# Patient Record
Sex: FEMALE | Race: WHITE
[De-identification: names, ages, dates, MRNs, and addresses within clinical notes are randomized per-mention and may not be internally consistent; named-entity substitution may affect disease eponyms.]

---

## 2017-07-12 ENCOUNTER — HOSPITAL ENCOUNTER (EMERGENCY)
Dept: HOSPITAL 45 - C.EDB | Age: 27
Discharge: HOME | End: 2017-07-12
Payer: COMMERCIAL

## 2017-07-12 VITALS — OXYGEN SATURATION: 95 % | HEART RATE: 61 BPM | DIASTOLIC BLOOD PRESSURE: 76 MMHG | SYSTOLIC BLOOD PRESSURE: 127 MMHG

## 2017-07-12 VITALS
BODY MASS INDEX: 31.49 KG/M2 | HEIGHT: 62.01 IN | WEIGHT: 173.28 LBS | HEIGHT: 62.01 IN | BODY MASS INDEX: 31.49 KG/M2 | WEIGHT: 173.28 LBS

## 2017-07-12 VITALS — TEMPERATURE: 98.24 F

## 2017-07-12 DIAGNOSIS — Z79.899: ICD-10-CM

## 2017-07-12 DIAGNOSIS — F41.9: ICD-10-CM

## 2017-07-12 DIAGNOSIS — Z86.61: ICD-10-CM

## 2017-07-12 DIAGNOSIS — E55.9: ICD-10-CM

## 2017-07-12 DIAGNOSIS — Z83.79: ICD-10-CM

## 2017-07-12 DIAGNOSIS — R51: ICD-10-CM

## 2017-07-12 DIAGNOSIS — Z83.3: ICD-10-CM

## 2017-07-12 DIAGNOSIS — I10: ICD-10-CM

## 2017-07-12 DIAGNOSIS — F17.210: ICD-10-CM

## 2017-07-12 DIAGNOSIS — E87.6: ICD-10-CM

## 2017-07-12 DIAGNOSIS — Z82.49: ICD-10-CM

## 2017-07-12 DIAGNOSIS — R07.2: Primary | ICD-10-CM

## 2017-07-12 LAB
ANION GAP SERPL CALC-SCNC: 8 MMOL/L (ref 3–11)
BASOPHILS # BLD: 0.04 K/UL (ref 0–0.2)
BASOPHILS NFR BLD: 0.3 %
BUN SERPL-MCNC: 5 MG/DL (ref 7–18)
BUN/CREAT SERPL: 7.7 (ref 10–20)
CALCIUM SERPL-MCNC: 9.2 MG/DL (ref 8.5–10.1)
CHLORIDE SERPL-SCNC: 105 MMOL/L (ref 98–107)
CO2 SERPL-SCNC: 27 MMOL/L (ref 21–32)
COMPLETE: YES
CREAT CL PREDICTED SERPL C-G-VRATE: 118.3 ML/MIN
CREAT SERPL-MCNC: 0.7 MG/DL (ref 0.6–1.2)
EOSINOPHIL NFR BLD AUTO: 278 K/UL (ref 130–400)
GLUCOSE SERPL-MCNC: 78 MG/DL (ref 70–99)
HCT VFR BLD CALC: 39.3 % (ref 37–47)
IG%: 0.2 %
IMM GRANULOCYTES NFR BLD AUTO: 27.3 %
LYMPHOCYTES # BLD: 3.82 K/UL (ref 1.2–3.4)
MCH RBC QN AUTO: 29.4 PG (ref 25–34)
MCHC RBC AUTO-ENTMCNC: 33.3 G/DL (ref 32–36)
MCV RBC AUTO: 88.1 FL (ref 80–100)
MONOCYTES NFR BLD: 7.2 %
NEUTROPHILS # BLD AUTO: 0.6 %
NEUTROPHILS NFR BLD AUTO: 64.4 %
PMV BLD AUTO: 10.6 FL (ref 7.4–10.4)
POTASSIUM SERPL-SCNC: 3.4 MMOL/L (ref 3.5–5.1)
RBC # BLD AUTO: 4.46 M/UL (ref 4.2–5.4)
SODIUM SERPL-SCNC: 140 MMOL/L (ref 136–145)
TSH SERPL-ACNC: 1 UIU/ML (ref 0.3–4.5)
WBC # BLD AUTO: 13.99 K/UL (ref 4.8–10.8)

## 2017-07-12 NOTE — EMERGENCY ROOM VISIT NOTE
History


First contact with patient:  18:15


Chief Complaint:  CARDIAC ASSESSMENT


Stated Complaint:  CHEST PAIN, DIZZINESS


Nursing Triage Summary:  


pt c/o chest pain started to worsen 2 weeks ago pt reports having chronic 


palpatations. has head pressure when standing, described as sharp and achey





History of Present Illness


The patient is a 26 year old female who presents to the Emergency Room with 

complaints of palpitations. The patient states that she has had this feeling 

that her heart is skipping beats and beating out of her chest over the last 2 

weeks, but has acutely worsened over the last week. She states that these 

usually are intermittent but have been consistently occurring throughout the 

day. She is currently on Atenolol for a rapid heart rate, and follows with 

[facility] cardiology. She has also had intermittent chest pain over the last 2 

weeks as well that changes in position over her chest. She states that she has 

also been lightheaded and dizzy during these episodes and is having difficulty 

standing and needs to lie down. She states like she feels she has a lot of 

pressure in her head and it feels like it is going to explode. She denies any 

fainting or loss of consciousness. When she lays down she is no longer 

lightheaded but still has the palpitations.  She denies any abdominal pain,and 

fever chills lightheadedness nausea or vomiting.  She has not taken any Ativan 

because she does not want to treat her symptoms with that.





Review of Systems


See HPI for pertinent positives and negatives.  A total of ten systems were 

reviewed and were otherwise negative.





Past Medical/Surgical History


Medical Problems:


(1) Anxiety


(2) HTN (hypertension)


(3) Hypokalemia


(4) left foot surgery


(5) Tibial plateau fracture, left


(6) Vitamin D deficiency








Family History





Diabetes mellitus


Gallbladder disease


Hypertension





Social History


Smoking Status:  Current Every Day Smoker


Alcohol Use:  occasionally


Drug Use:  none


Marital Status:  single


Housing Status:  other


Occupation Status:  employed





Current/Historical Medications


Scheduled


Atenolol (Atenolol), 25 MG PO QAM





Allergies


Coded Allergies:  


     Tramadol (Verified  Adverse Reaction, Intermediate, N/V, 10/16/16)





Physical Exam


Vital Signs











  Date Time  Temp Pulse Resp B/P (MAP) Pulse Ox O2 Delivery O2 Flow Rate FiO2


 


7/12/17 21:44  61 20 127/76 95 Room Air  


 


7/12/17 20:30  72 20 124/87 98 Room Air  


 


7/12/17 19:47  66 20 117/83 99 Room Air  


 


7/12/17 19:07  69      


 


7/12/17 19:03  68 20 134/91 99 Room Air  


 


7/12/17 17:24 36.8 75 18 147/94 100 Room Air  











Physical Exam


GENERAL: Awake, alert, well-appearing, in no distress


HENT: Normocephalic, atraumatic. 


EYES: Normal conjunctiva. Sclera non-icteric.


NECK: Supple. No nuchal rigidity.


RESPIRATORY: Clear to auscultation.


CARDIAC: Regular rate, normal rhythm. Extremities warm and well perfused. 

Pulses equal.


ABDOMEN: Soft, non-distended. No tenderness to palpation. No rebound or 

guarding. No masses.


RECTAL: Deferred.


MUSCULOSKELETAL: Chest examination reveals no tenderness. 


LOWER EXTREMITIES: Calves are equal size bilaterally and non-tender. No edema. 

No discoloration. 


NEURO: Normal sensorium. No sensory or motor deficits noted. 


SKIN: No rash or jaundice noted.





Medical Decision & Procedures


Laboratory Results


7/12/17 18:55








Red Blood Count 4.46, Mean Corpuscular Volume 88.1, Mean Corpuscular Hemoglobin 

29.4, Mean Corpuscular Hemoglobin Concent 33.3, Mean Platelet Volume 10.6, 

Neutrophils (%) (Auto) 64.4, Lymphocytes (%) (Auto) 27.3, Monocytes (%) (Auto) 

7.2, Eosinophils (%) (Auto) 0.6, Basophils (%) (Auto) 0.3, Neutrophils # (Auto) 

9.00, Lymphocytes # (Auto) 3.82, Monocytes # (Auto) 1.01, Eosinophils # (Auto) 

0.09, Basophils # (Auto) 0.04





7/12/17 18:55

















Test


  7/12/17


18:40 7/12/17


18:55 7/12/17


19:01 7/12/17


21:15


 


Urine Pregnancy Test NEG (NEG)    


 


White Blood Count


  


  13.99 K/uL


(4.8-10.8) 


  


 


 


Red Blood Count


  


  4.46 M/uL


(4.2-5.4) 


  


 


 


Hemoglobin


  


  13.1 g/dL


(12.0-16.0) 


  


 


 


Hematocrit  39.3 % (37-47)   


 


Mean Corpuscular Volume


  


  88.1 fL


() 


  


 


 


Mean Corpuscular Hemoglobin


  


  29.4 pg


(25-34) 


  


 


 


Mean Corpuscular Hemoglobin


Concent 


  33.3 g/dl


(32-36) 


  


 


 


Platelet Count


  


  278 K/uL


(130-400) 


  


 


 


Mean Platelet Volume


  


  10.6 fL


(7.4-10.4) 


  


 


 


Neutrophils (%) (Auto)  64.4 %   


 


Lymphocytes (%) (Auto)  27.3 %   


 


Monocytes (%) (Auto)  7.2 %   


 


Eosinophils (%) (Auto)  0.6 %   


 


Basophils (%) (Auto)  0.3 %   


 


Neutrophils # (Auto)


  


  9.00 K/uL


(1.4-6.5) 


  


 


 


Lymphocytes # (Auto)


  


  3.82 K/uL


(1.2-3.4) 


  


 


 


Monocytes # (Auto)


  


  1.01 K/uL


(0.11-0.59) 


  


 


 


Eosinophils # (Auto)


  


  0.09 K/uL


(0-0.5) 


  


 


 


Basophils # (Auto)


  


  0.04 K/uL


(0-0.2) 


  


 


 


RDW Standard Deviation


  


  44.1 fL


(36.4-46.3) 


  


 


 


RDW Coefficient of Variation


  


  13.6 %


(11.5-14.5) 


  


 


 


Immature Granulocyte % (Auto)  0.2 %   


 


Immature Granulocyte # (Auto)


  


  0.03 K/uL


(0.00-0.02) 


  


 


 


Anion Gap


  


  8.0 mmol/L


(3-11) 


  


 


 


Est Creatinine Clear Calc


Drug Dose 


  118.3 ml/min 


  


  


 


 


Estimated GFR (


American) 


  138.6 


  


  


 


 


Estimated GFR (Non-


American 


  119.6 


  


  


 


 


BUN/Creatinine Ratio  7.7 (10-20)   


 


Calcium Level


  


  9.2 mg/dl


(8.5-10.1) 


  


 


 


Thyroid Stimulating Hormone


(TSH) 


  0.998 uIu/ml


(0.300-4.500) 


  


 


 


Bedside D-Dimer


  


  


  123 ng/mlFEU


(0-450) 


 











Medications Administered











 Medications


  (Trade)  Dose


 Ordered  Sig/Alex


 Route  Start Time


 Stop Time Status Last Admin


Dose Admin


 


 Ketorolac


 Tromethamine


  (Toradol Inj)  30 mg  NOW  STAT


 IV  7/12/17 20:00


 7/12/17 20:01 DC 7/12/17 20:33


30 MG











Medical Decision


Patient is a 26 year old female that presents with chest pain and palpitations





Differential diagnosis includes anxiety, electrolyte abnormality, arrhythmia, 

cardiac ischemia, pulmonary embolism, pneumothorax, infection, musculoskeletal, 

and other etiologies were considered as well.





Labs: CBC, CMP, Troponin, D-Dimer, TSH, Urine Pregnancy


Imaging: Chest X-Ray, EKG


Medications: Toradol 30mg IV





Impression





 Primary Impression:  


 Chest pain


Patient is a 26 year old female that presents with chest pain and palpitations


- EKG shows NSR with no ST changes


- CBC, CMP, D-Dimer, Troponin, and TSH are within normal limits


- Workup negative and no acute findings on imaging or laboratory work


- Discussed with case management making an appointment with [facility] at first available appointment





Departure Information


Dispostion


Home / Self-Care





Condition


GOOD





Referrals


No Doctor, Assigned (PCP)





Patient Instructions


My [facility]





Additional Instructions





Follow up with [facility] at the first available appointment for 

follow up of your current condition





Ibuprofen(Motrin, Advil) may be used for fever or pain.  Use 600mg every six 

hours as needed.  Take with food.  Avoid using more than 2400mg in a 24 hour 

period.  Do not use 2400mg per day for more than three consecutive days without 

physician direction.  Prolonged inappropriate use can lead to stomach upset or 

ulcers. 


(AND/OR)


Acetaminophen(Tylenol) may be used for fever or pain.  Use 1000mg every six 

hours as needed.  Avoid using more than 4000mg in a 24 hour period.  





Rest and drink plenty of fluids as tolerated.





Continue current medications.





Avoid strenuous activities and anything that worsens your pain.  Resume normal 

activities once your symptoms resolve.    





Return to the ER immediately for worsening or persistent chest pain, abdominal 

pain, vomiting, fevers, chest pains, difficulty breathing, worsening of your 

condition, or as needed.





Problem Qualifiers








 Primary Impression:  


 Chest pain


 Chest pain type:  unspecified  Qualified Codes:  R07.9 - Chest pain, 

unspecified

## 2017-07-12 NOTE — DIAGNOSTIC IMAGING REPORT
CHEST ONE VIEW PORTABLE



CLINICAL HISTORY: Palpitations, Chest Pain    



COMPARISON STUDY:  Chest radiograph August 7, 2014.



FINDINGS: Lung volumes are normal. There is no pneumothorax or pleural effusion.

There is no consolidation to suggest pneumonia. Pulmonary vascularity is normal.

Cardiomediastinal silhouette is normal. 



IMPRESSION:  No acute cardiopulmonary findings. 









Electronically signed by:  Wisam Shankar M.D.

7/12/2017 6:40 PM



Dictated Date/Time:  7/12/2017 6:40 PM

## 2017-12-04 ENCOUNTER — HOSPITAL ENCOUNTER (EMERGENCY)
Dept: HOSPITAL 45 - C.EDB | Age: 27
Discharge: HOME | End: 2017-12-04
Payer: COMMERCIAL

## 2017-12-04 VITALS
BODY MASS INDEX: 29.96 KG/M2 | HEIGHT: 62.01 IN | HEIGHT: 62.01 IN | WEIGHT: 164.91 LBS | BODY MASS INDEX: 29.96 KG/M2 | WEIGHT: 164.91 LBS

## 2017-12-04 VITALS — OXYGEN SATURATION: 99 % | SYSTOLIC BLOOD PRESSURE: 112 MMHG | HEART RATE: 76 BPM | DIASTOLIC BLOOD PRESSURE: 80 MMHG

## 2017-12-04 VITALS — TEMPERATURE: 98.24 F

## 2017-12-04 DIAGNOSIS — D72.819: ICD-10-CM

## 2017-12-04 DIAGNOSIS — O21.1: Primary | ICD-10-CM

## 2017-12-04 DIAGNOSIS — Z83.3: ICD-10-CM

## 2017-12-04 DIAGNOSIS — Z98.890: ICD-10-CM

## 2017-12-04 DIAGNOSIS — Z83.79: ICD-10-CM

## 2017-12-04 DIAGNOSIS — I10: ICD-10-CM

## 2017-12-04 DIAGNOSIS — Z88.5: ICD-10-CM

## 2017-12-04 DIAGNOSIS — E55.9: ICD-10-CM

## 2017-12-04 DIAGNOSIS — Z3A.10: ICD-10-CM

## 2017-12-04 DIAGNOSIS — F41.9: ICD-10-CM

## 2017-12-04 DIAGNOSIS — Z87.81: ICD-10-CM

## 2017-12-04 DIAGNOSIS — F17.200: ICD-10-CM

## 2017-12-04 DIAGNOSIS — Z79.899: ICD-10-CM

## 2017-12-04 DIAGNOSIS — Z82.49: ICD-10-CM

## 2017-12-04 LAB
ALBUMIN/GLOB SERPL: 0.9 {RATIO} (ref 0.9–2)
ALP SERPL-CCNC: 87 U/L (ref 45–117)
ALT SERPL-CCNC: 30 U/L (ref 12–78)
ANION GAP SERPL CALC-SCNC: 4 MMOL/L (ref 3–11)
APPEARANCE UR: (no result)
AST SERPL-CCNC: 14 U/L (ref 15–37)
BASOPHILS # BLD: 0.03 K/UL (ref 0–0.2)
BASOPHILS NFR BLD: 0.2 %
BILIRUB UR-MCNC: (no result) MG/DL
BUN SERPL-MCNC: 8 MG/DL (ref 7–18)
BUN/CREAT SERPL: 15.4 (ref 10–20)
CALCIUM SERPL-MCNC: 8.8 MG/DL (ref 8.5–10.1)
CHLORIDE SERPL-SCNC: 103 MMOL/L (ref 98–107)
CO2 SERPL-SCNC: 25 MMOL/L (ref 21–32)
COLOR UR: YELLOW
COMPLETE: YES
CREAT CL PREDICTED SERPL C-G-VRATE: 153.9 ML/MIN
CREAT SERPL-MCNC: 0.52 MG/DL (ref 0.6–1.2)
EOSINOPHIL NFR BLD AUTO: 292 K/UL (ref 130–400)
GLOBULIN SER-MCNC: 3.9 GM/DL (ref 2.5–4)
GLUCOSE SERPL-MCNC: 88 MG/DL (ref 70–99)
HCT VFR BLD CALC: 39.7 % (ref 37–47)
IG%: 0.4 %
IMM GRANULOCYTES NFR BLD AUTO: 19.6 %
LYMPHOCYTES # BLD: 3 K/UL (ref 1.2–3.4)
MANUAL MICROSCOPIC REQUIRED?: NO
MCH RBC QN AUTO: 29.5 PG (ref 25–34)
MCHC RBC AUTO-ENTMCNC: 34 G/DL (ref 32–36)
MCV RBC AUTO: 86.9 FL (ref 80–100)
MONOCYTES NFR BLD: 8.2 %
MUCOUS THREADS URNS QL MICRO: PRESENT
NEUTROPHILS # BLD AUTO: 0.6 %
NEUTROPHILS NFR BLD AUTO: 71 %
NITRITE UR QL STRIP: (no result)
PH UR STRIP: 6 [PH] (ref 4.5–7.5)
PMV BLD AUTO: 10.6 FL (ref 7.4–10.4)
POTASSIUM SERPL-SCNC: 3.5 MMOL/L (ref 3.5–5.1)
RBC # BLD AUTO: 4.57 M/UL (ref 4.2–5.4)
REVIEW REQ?: YES
SODIUM SERPL-SCNC: 132 MMOL/L (ref 136–145)
SP GR UR STRIP: 1.02 (ref 1–1.03)
TSH SERPL-ACNC: 0.6 UIU/ML (ref 0.3–4.5)
URINE BILL WITH OR WITHOUT MIC: (no result)
URINE EPITHELIAL CELL AUTO: >30 /LPF (ref 0–5)
UROBILINOGEN UR-MCNC: (no result) MG/DL
WBC # BLD AUTO: 15.32 K/UL (ref 4.8–10.8)
ZZUR CULT IF INDIC CLEAN CATCH: YES

## 2017-12-04 NOTE — EMERGENCY ROOM VISIT NOTE
History


Report prepared by Roxanne:  Alicia Conway


Under the Supervision of:  Dr. Roosevelt Haywood M.D.


First contact with patient:  16:12


Chief Complaint:  VOMITING


Stated Complaint:  DIZZY, VOMITING - 10 WKS. PREGNANT





History of Present Illness


The patient is a 27 year old female who presents to the Emergency Room with 

complaints of intermittent vomiting beginning 2 days ago. The patient states 

that she is 10 weeks pregnant and this is her first pregnancy. She reports that 

she has had an ultrasound but has not been to see an OB/GYN doctor yet. She 

notes that over the last 2 days she has only been able to eat once a day and is 

having a hard time keeping fluids down. The patient states that she has been 

feeling dizzy and has had one episode of diarrhea. She denies any abdominal pain

, urinary symptoms, fever, cough, and cold symptoms. The patient states that 

she has had vaginal spotting a few times throughout this pregnancy.





   Source of History:  patient


   Onset:  2 days ago


   Position:  other (global)


   Quality:  other (vomiting)


   Timing:  constant


   Associated Symptoms:  + diarrhea, No fevers, No cough, No abdominal pain, No 

urinary symptoms


Note:


Pt complains of dizziness. She denies any cold symptoms.





Review of Systems


See HPI for pertinent positives & negatives. A total of 10 systems reviewed and 

were otherwise negative.





Past Medical & Surgical


Medical Problems:


(1) Anxiety


(2) HTN (hypertension)


(3) Hypokalemia


(4) left foot surgery


(5) Tibial plateau fracture, left


(6) Vitamin D deficiency








Family History





Diabetes mellitus


Gallbladder disease


Hypertension





Social History


Smoking Status:  Current Every Day Smoker


Alcohol Use:  occasionally


Drug Use:  none


Marital Status:  single


Housing Status:  other


Occupation Status:  employed





Current/Historical Medications


Scheduled


Atenolol (Atenolol), 25 MG PO QAM


Ondasetron Odt (Zofran Odt), 4 MG SL Q6H





Allergies


Coded Allergies:  


     Tramadol (Verified  Adverse Reaction, Intermediate, N/V, 10/16/16)





Physical Exam


Vital Signs











  Date Time  Temp Pulse Resp B/P (MAP) Pulse Ox O2 Delivery O2 Flow Rate FiO2


 


12/4/17 18:34  76 20 112/80 99   


 


12/4/17 18:03  76 20 110/56 96 Room Air  


 


12/4/17 14:41 36.8 77 18 115/79 99 Room Air  











Physical Exam


GENERAL: Patient is in no acute distress.


HEENT: No acute trauma, normocephalic atraumatic, mucous membranes moist, no 

nasal congestion, no scleral icterus.


NECK: No stridor, no adenopathy, no meningismus, trachea is midline.


LUNGS: Clear to auscultation bilaterally, no wheeze, no rhonchi, breath sounds 

equal.


HEART: Without murmurs gallops or rubs, regular rate and rhythm.


ABDOMEN: Soft, nontender, bowel sounds positive, no hernias, no peritonitis.


EXTREMITIES: No cyanosis or edema, full range of motion of all the joints 

without pain or difficulty, no signs for acute trauma.


NEUROLOGIC: Oriented x 3, no acute motor or sensory deficits, no focal weakness.


SKIN: No rash, no jaundice, no diaphoresis.





Medical Decision & Procedures


Laboratory Results


12/4/17 16:34








Red Blood Count 4.57, Mean Corpuscular Volume 86.9, Mean Corpuscular Hemoglobin 

29.5, Mean Corpuscular Hemoglobin Concent 34.0, Mean Platelet Volume 10.6, 

Neutrophils (%) (Auto) 71.0, Lymphocytes (%) (Auto) 19.6, Monocytes (%) (Auto) 

8.2, Eosinophils (%) (Auto) 0.6, Basophils (%) (Auto) 0.2, Neutrophils # (Auto) 

10.88, Lymphocytes # (Auto) 3.00, Monocytes # (Auto) 1.26, Eosinophils # (Auto) 

0.09, Basophils # (Auto) 0.03





12/4/17 16:34

















Test


  12/4/17


16:30 12/4/17


16:34


 


Urine Color YELLOW  


 


Urine Appearance TURBID (CLEAR)  


 


Urine pH 6.0 (4.5-7.5)  


 


Urine Specific Gravity


  1.021


(1.000-1.030) 


 


 


Urine Protein NEG (NEG)  


 


Urine Glucose (UA) NEG (NEG)  


 


Urine Ketones 2+ (NEG)  


 


Urine Occult Blood NEG (NEG)  


 


Urine Nitrite NEG (NEG)  


 


Urine Bilirubin NEG (NEG)  


 


Urine Urobilinogen NEG (NEG)  


 


Urine Leukocyte Esterase NEG (NEG)  


 


Urine WBC (Auto)


  5-10 /hpf


(0-5) 


 


 


Urine RBC (Auto) 0-4 /hpf (0-4)  


 


Urine Hyaline Casts (Auto) 1-5 /lpf (0-5)  


 


Urine Epithelial Cells (Auto) >30 /lpf (0-5)  


 


Urine Bacteria (Auto) 2+ (NEG)  


 


Urine Pathogenic Casts  /lpf (0)  


 


Urine Mucus


  PRESENT (NONE


PRSENT) 


 


 


White Blood Count


  


  15.32 K/uL


(4.8-10.8)


 


Red Blood Count


  


  4.57 M/uL


(4.2-5.4)


 


Hemoglobin


  


  13.5 g/dL


(12.0-16.0)


 


Hematocrit  39.7 % (37-47) 


 


Mean Corpuscular Volume


  


  86.9 fL


()


 


Mean Corpuscular Hemoglobin


  


  29.5 pg


(25-34)


 


Mean Corpuscular Hemoglobin


Concent 


  34.0 g/dl


(32-36)


 


Platelet Count


  


  292 K/uL


(130-400)


 


Mean Platelet Volume


  


  10.6 fL


(7.4-10.4)


 


Neutrophils (%) (Auto)  71.0 % 


 


Lymphocytes (%) (Auto)  19.6 % 


 


Monocytes (%) (Auto)  8.2 % 


 


Eosinophils (%) (Auto)  0.6 % 


 


Basophils (%) (Auto)  0.2 % 


 


Neutrophils # (Auto)


  


  10.88 K/uL


(1.4-6.5)


 


Lymphocytes # (Auto)


  


  3.00 K/uL


(1.2-3.4)


 


Monocytes # (Auto)


  


  1.26 K/uL


(0.11-0.59)


 


Eosinophils # (Auto)


  


  0.09 K/uL


(0-0.5)


 


Basophils # (Auto)


  


  0.03 K/uL


(0-0.2)


 


RDW Standard Deviation


  


  42.7 fL


(36.4-46.3)


 


RDW Coefficient of Variation


  


  13.4 %


(11.5-14.5)


 


Immature Granulocyte % (Auto)  0.4 % 


 


Immature Granulocyte # (Auto)


  


  0.06 K/uL


(0.00-0.02)


 


Anion Gap


  


  4.0 mmol/L


(3-11)


 


Est Creatinine Clear Calc


Drug Dose 


  153.9 ml/min 


 


 


Estimated GFR (


American) 


  > 150.0 


 


 


Estimated GFR (Non-


American 


  130.9 


 


 


BUN/Creatinine Ratio  15.4 (10-20) 


 


Calcium Level


  


  8.8 mg/dl


(8.5-10.1)


 


Total Bilirubin


  


  0.2 mg/dl


(0.2-1)


 


Aspartate Amino Transf


(AST/SGOT) 


  14 U/L (15-37) 


 


 


Alanine Aminotransferase


(ALT/SGPT) 


  30 U/L (12-78) 


 


 


Alkaline Phosphatase


  


  87 U/L


()


 


Total Protein


  


  7.5 gm/dl


(6.4-8.2)


 


Albumin


  


  3.6 gm/dl


(3.4-5.0)


 


Globulin


  


  3.9 gm/dl


(2.5-4.0)


 


Albumin/Globulin Ratio  0.9 (0.9-2) 


 


Thyroid Stimulating Hormone


(TSH) 


  0.605 uIu/ml


(0.300-4.500)





Laboratory results reviewed by me.





Medications Administered











 Medications


  (Trade)  Dose


 Ordered  Sig/Alex


 Route  Start Time


 Stop Time Status Last Admin


Dose Admin


 


 Sodium Chloride  2,000 ml @ 


 999 mls/hr  Q2H1M STAT


 IV  12/4/17 16:17


 12/4/17 18:17 DC 12/4/17 16:35


999 MLS/HR


 


 Ondansetron HCl


  (Zofran Inj)  4 mg  NOW  STAT


 IV  12/4/17 16:17


 12/4/17 16:20 DC 12/4/17 16:40


4 MG


 


 Diphenhydramine


 HCl


  (Benadryl Inj)  25 mg  NOW  STAT


 IV  12/4/17 16:17


 12/4/17 16:20 DC 12/4/17 16:41


25 MG











ED Course


1612: The patient was evaluated in room A8. A complete history and physical 

exam was performed.





1617: Benadryl Inj 25mg IV, Zofran Inj 4mg IV, Sodium Chloride 2000 ml @ 999 mls

/hr IV. 





1750: I reevaluated and updated the patient. 





1806: Reevaluated the patient. Discussed results and discharge instructions: 

She verbalized understanding and agreement. The patient is ready for discharge.





Medical Decision


The patient is a 27 year old female who presents to the ED with complaints of 

intermittent vomiting.  Differential diagnoses considered include hyperemesis, 

vomiting secondary to pregnancy, UTI, dehydration, obstruction, anemia, 

electrolyte imbalance.





There is a moderate leukocytosis at 15,000.  This white count elevation could 

be consistent with infection or just her pregnancy and vomiting.  No concerning 

anemia.  No significant electrolyte abnormality, kidney failure.  There is no 

evidence for acute UTI.  The patient appears to be in a euthyroid state.  On 

exam, the patient was not toxic or febrile.  There was no abdominal pain, she 

has not had significant vaginal discharge or bleeding.





The patient's symptoms are consistent with vomiting and nausea from the 

pregnancy itself.  She was given IV saline, IV Zofran and IV Benadryl, she 

feels improved.  She is being discharged with Zofran to use if needed for 

persistent nausea and vomiting.  She will follow with OB and return here if 

worsening.





Medication Reconcilliation


Current Medication List:  was personally reviewed by me





Blood Pressure Screening


Patient's blood pressure:  Normal blood pressure


Blood pressure disposition:  Did not require urgent referral





Impression





 Primary Impression:  


 Dehydration


 Additional Impressions:  


 Vomiting


 Pregnancy





Scribe Attestation


The scribe's documentation has been prepared under my direction and personally 

reviewed by me in its entirety. I confirm that the note above accurately 

reflects all work, treatment, procedures, and medical decision making performed 

by me.





Departure Information


Dispostion


Home / Self-Care





Prescriptions





Ondasetron Odt (ZOFRAN ODT) 4 Mg Tab


4 MG SL Q6H for Nausea, #10 TAB


   Prov: Roosevelt Haywood M.D.         12/4/17





Referrals


No Doctor, Assigned (PCP)





Forms


HOME CARE DOCUMENTATION FORM,                                                 

               IMPORTANT VISIT INFORMATION





Patient Instructions


My [facility]





Additional Instructions





smaller, more frequest meals---no large meals at a time


may use zofran 1 tab every 6 hours as needed for nausea


see ob in followup-


return for worsening symptoms





lab work today was ok





Problem Qualifiers

## 2017-12-12 ENCOUNTER — HOSPITAL ENCOUNTER (EMERGENCY)
Dept: HOSPITAL 45 - C.EDB | Age: 27
Discharge: HOME | End: 2017-12-12
Payer: COMMERCIAL

## 2017-12-12 VITALS
BODY MASS INDEX: 29.64 KG/M2 | HEIGHT: 62.01 IN | WEIGHT: 163.14 LBS | WEIGHT: 163.14 LBS | BODY MASS INDEX: 29.64 KG/M2 | HEIGHT: 62.01 IN

## 2017-12-12 VITALS
OXYGEN SATURATION: 99 % | HEART RATE: 67 BPM | DIASTOLIC BLOOD PRESSURE: 68 MMHG | SYSTOLIC BLOOD PRESSURE: 108 MMHG | TEMPERATURE: 98.24 F

## 2017-12-12 DIAGNOSIS — Z79.899: ICD-10-CM

## 2017-12-12 DIAGNOSIS — I10: ICD-10-CM

## 2017-12-12 DIAGNOSIS — O26.92: Primary | ICD-10-CM

## 2017-12-12 DIAGNOSIS — Z3A.11: ICD-10-CM

## 2017-12-12 DIAGNOSIS — E55.9: ICD-10-CM

## 2017-12-12 DIAGNOSIS — R51: ICD-10-CM

## 2017-12-12 DIAGNOSIS — F41.9: ICD-10-CM

## 2017-12-12 DIAGNOSIS — Z88.3: ICD-10-CM

## 2017-12-12 DIAGNOSIS — D72.829: ICD-10-CM

## 2017-12-12 DIAGNOSIS — Z88.8: ICD-10-CM

## 2017-12-12 DIAGNOSIS — Z82.49: ICD-10-CM

## 2017-12-12 DIAGNOSIS — Z87.81: ICD-10-CM

## 2017-12-12 DIAGNOSIS — F17.200: ICD-10-CM

## 2017-12-12 DIAGNOSIS — Z83.79: ICD-10-CM

## 2017-12-12 LAB
ANION GAP SERPL CALC-SCNC: 7 MMOL/L (ref 3–11)
BASOPHILS # BLD: 0.03 K/UL (ref 0–0.2)
BASOPHILS NFR BLD: 0.2 %
BUN SERPL-MCNC: 5 MG/DL (ref 7–18)
BUN/CREAT SERPL: 10 (ref 10–20)
CALCIUM SERPL-MCNC: 9 MG/DL (ref 8.5–10.1)
CHLORIDE SERPL-SCNC: 104 MMOL/L (ref 98–107)
CO2 SERPL-SCNC: 24 MMOL/L (ref 21–32)
COMPLETE: YES
CREAT CL PREDICTED SERPL C-G-VRATE: 162.5 ML/MIN
CREAT SERPL-MCNC: 0.49 MG/DL (ref 0.6–1.2)
EOSINOPHIL NFR BLD AUTO: 269 K/UL (ref 130–400)
GLUCOSE SERPL-MCNC: 74 MG/DL (ref 70–99)
HCT VFR BLD CALC: 37.3 % (ref 37–47)
IG%: 0.3 %
IMM GRANULOCYTES NFR BLD AUTO: 20.3 %
LYMPHOCYTES # BLD: 3.06 K/UL (ref 1.2–3.4)
MCH RBC QN AUTO: 30.6 PG (ref 25–34)
MCHC RBC AUTO-ENTMCNC: 34.6 G/DL (ref 32–36)
MCV RBC AUTO: 88.4 FL (ref 80–100)
MONOCYTES NFR BLD: 8.4 %
NEUTROPHILS # BLD AUTO: 0.7 %
NEUTROPHILS NFR BLD AUTO: 70.1 %
PMV BLD AUTO: 10.7 FL (ref 7.4–10.4)
POTASSIUM SERPL-SCNC: 3.8 MMOL/L (ref 3.5–5.1)
RBC # BLD AUTO: 4.22 M/UL (ref 4.2–5.4)
SODIUM SERPL-SCNC: 135 MMOL/L (ref 136–145)
WBC # BLD AUTO: 15.04 K/UL (ref 4.8–10.8)

## 2017-12-12 NOTE — DIAGNOSTIC IMAGING REPORT
CT HEAD WITHOUT CONTRAST (CT)



CLINICAL HISTORY: Worsening headaches.    



COMPARISON STUDY:  7/15/2014



TECHNIQUE:  Axial CT of the brain is performed from the vertex to the skull

base. IV contrast was not administered for this examination. A dose lowering

technique was utilized adhering to the principles of ALARA.

 



CT DOSE: 638.56 mGycm



FINDINGS:



No intra or extra-axial mass lesions are visualized. There is no CT evidence of

acute cortical infarction. There is no evidence of midline shift. There is no

acute  hemorrhage. No calvarial fractures are visualized. 

   

There is no evidence of pathologic ventricular dilatation.

There is no evidence of acute sinusitis



IMPRESSION: Normal noncontrast head CT.







Electronically signed by:  Samir Duran M.D.

12/12/2017 2:24 PM



Dictated Date/Time:  12/12/2017 2:23 PM

## 2017-12-12 NOTE — EMERGENCY ROOM VISIT NOTE
History


Report prepared by Roxanne:  Harley Davis


Under the Supervision of:  Dr. Sandeep Angel D.O.


First contact with patient:  13:00


Chief Complaint:  HEADACHE


Stated Complaint:  HEAD PRESSURE





History of Present Illness


The patient is a 27 year old female who presents to the Emergency Room with 

complaints of a worsening on and off headache for the past six months. The 

patient states that she feels like she has a diffuse pressure in her head. She 

states that it initially only occurred while laying on her abdomen, however now 

it is constant, and nothing makes it any better or worse. She notes that she is 

currently 11 weeks pregnant with her first child with no complications. Pt 

denies headache, change in vision, fevers, cough, runny nose, weakness, numbness

, neck stiffness, chest pain, shortness of breath, nausea, vomiting, diarrhea, 

pain with urination, melena, and history of blood clots.  She has never had 

this before.





   Source of History:  patient


   Onset:  6 months ago


   Position:  head


   Quality:  other (pressure)


   Timing:  worsening, other (on and off)


   Associated Symptoms:  No fevers, No cough, No chest pain, No SOB, No nausea, 

No vomiting





Review of Systems


See HPI for pertinent positives & negatives. A total of 10 systems reviewed and 

were otherwise negative.





Past Medical & Surgical


Medical Problems:


(1) Anxiety


(2) HTN (hypertension)


(3) Hypokalemia


(4) left foot surgery


(5) Tibial plateau fracture, left


(6) Vitamin D deficiency








Family History





Diabetes mellitus


Gallbladder disease


Hypertension





Social History


Smoking Status:  Current Every Day Smoker


Alcohol Use:  occasionally


Drug Use:  none


Marital Status:  single


Housing Status:  other


Occupation Status:  employed





Current/Historical Medications


Scheduled


Amoxicillin (Amoxil), 500 MG PO TID


Atenolol (Atenolol), 25 MG PO QAM





Allergies


Coded Allergies:  


     Tramadol (Verified  Adverse Reaction, Intermediate, N/V, 12/12/17)





Physical Exam


Vital Signs











  Date Time  Temp Pulse Resp B/P (MAP) Pulse Ox O2 Delivery O2 Flow Rate FiO2


 


12/12/17 15:53 36.8 67 18 108/68 99   


 


12/12/17 14:57 36.8 67 18 108/68 99 Room Air  


 


12/12/17 13:39  66 18 109/73 98   


 


12/12/17 12:42  80      


 


12/12/17 12:38 36.8 97 20 109/73 97 Room Air  


 


12/12/17 12:30 36.8 80 20 100/52 98 Room Air  











Physical Exam


GENERAL: Sitting up in bed, alert, well appearing, well nourished, no distress, 

non-toxic 


EYE EXAM: normal conjunctiva. PERRL and EOM's intact. On funduscopic exam, the 

optic discs are sharp.


OROPHARYNX: no exudate, no erythema, lips, buccal mucosa, and tongue normal and 

mucous membranes are moist


EARS: Right TM is clear and the left TM is difficult to visualize with some 

surrounding erythema and whitish discharge and swelling within the canal.


NECK: supple, no nuchal rigidity, no adenopathy, non-tender


LUNGS: Clear to auscultation. Normal chest wall mechanics


HEART: no murmurs, S1 normal and S2 normal 


ABDOMEN: abdomen soft, non-tender, normo-active bowel sounds, no masses, no 

rebound or guarding. 


BACK: Back is symmetrical on inspection and there is no deformity, no midline 

tenderness, no CVA tenderness. 


SKIN: no rashes and no bruising 


UPPER EXTREMITIES: upper extremities are grossly normal. 


LOWER EXTREMITIES: No pitting edema.


NEURO EXAM: Normal sensorium, cranial nerves II-XII intact, normal speech,  no 

weakness of arms, no weakness of legs. No drift. Finger to nose intact. Gross 

sensation intact. Rapid alternating movements of the upper extremities intact.





Medical Decision & Procedures


ER Provider


Diagnostic Interpretation:


Radiology results as stated below per my review and the radiologist's 

interpretation: 














CT HEAD WITHOUT CONTRAST (CT)





CLINICAL HISTORY: Worsening headaches.    





COMPARISON STUDY:  7/15/2014





TECHNIQUE:  Axial CT of the brain is performed from the vertex to the skull


base. IV contrast was not administered for this examination. A dose lowering


technique was utilized adhering to the principles of ALARA.


 





CT DOSE: 638.56 mGycm





FINDINGS:





No intra or extra-axial mass lesions are visualized. There is no CT evidence of


acute cortical infarction. There is no evidence of midline shift. There is no


acute  hemorrhage. No calvarial fractures are visualized. 


   


There is no evidence of pathologic ventricular dilatation.


There is no evidence of acute sinusitis





IMPRESSION: Normal noncontrast head CT.





Electronically signed by:  Samir Duran M.D.


12/12/2017 2:24 PM





Dictated Date/Time:  12/12/2017 2:23 PM





Laboratory Results


12/12/17 13:40








Red Blood Count 4.22, Mean Corpuscular Volume 88.4, Mean Corpuscular Hemoglobin 

30.6, Mean Corpuscular Hemoglobin Concent 34.6, Mean Platelet Volume 10.7, 

Neutrophils (%) (Auto) 70.1, Lymphocytes (%) (Auto) 20.3, Monocytes (%) (Auto) 

8.4, Eosinophils (%) (Auto) 0.7, Basophils (%) (Auto) 0.2, Neutrophils # (Auto) 

10.54, Lymphocytes # (Auto) 3.06, Monocytes # (Auto) 1.26, Eosinophils # (Auto) 

0.11, Basophils # (Auto) 0.03





12/12/17 13:40

















Test


  12/12/17


13:40


 


White Blood Count


  15.04 K/uL


(4.8-10.8)


 


Red Blood Count


  4.22 M/uL


(4.2-5.4)


 


Hemoglobin


  12.9 g/dL


(12.0-16.0)


 


Hematocrit 37.3 % (37-47) 


 


Mean Corpuscular Volume


  88.4 fL


()


 


Mean Corpuscular Hemoglobin


  30.6 pg


(25-34)


 


Mean Corpuscular Hemoglobin


Concent 34.6 g/dl


(32-36)


 


Platelet Count


  269 K/uL


(130-400)


 


Mean Platelet Volume


  10.7 fL


(7.4-10.4)


 


Neutrophils (%) (Auto) 70.1 % 


 


Lymphocytes (%) (Auto) 20.3 % 


 


Monocytes (%) (Auto) 8.4 % 


 


Eosinophils (%) (Auto) 0.7 % 


 


Basophils (%) (Auto) 0.2 % 


 


Neutrophils # (Auto)


  10.54 K/uL


(1.4-6.5)


 


Lymphocytes # (Auto)


  3.06 K/uL


(1.2-3.4)


 


Monocytes # (Auto)


  1.26 K/uL


(0.11-0.59)


 


Eosinophils # (Auto)


  0.11 K/uL


(0-0.5)


 


Basophils # (Auto)


  0.03 K/uL


(0-0.2)


 


RDW Standard Deviation


  43.5 fL


(36.4-46.3)


 


RDW Coefficient of Variation


  13.5 %


(11.5-14.5)


 


Immature Granulocyte % (Auto) 0.3 % 


 


Immature Granulocyte # (Auto)


  0.04 K/uL


(0.00-0.02)


 


Anion Gap


  7.0 mmol/L


(3-11)


 


Est Creatinine Clear Calc


Drug Dose 162.5 ml/min 


 


 


Estimated GFR (


American) > 150.0 


 


 


Estimated GFR (Non-


American 133.5 


 


 


BUN/Creatinine Ratio 10.0 (10-20) 


 


Calcium Level


  9.0 mg/dl


(8.5-10.1)


 


Human Chorionic Gonadotropin,


Quant 51073 mIU/mL 


 








Laboratory results per my review.





Medications Administered











 Medications


  (Trade)  Dose


 Ordered  Sig/Alex


 Route  Start Time


 Stop Time Status Last Admin


Dose Admin


 


 Acetaminophen


  (Tylenol Tab)  500 mg  NOW  STAT


 PO  12/12/17 13:07


 12/12/17 13:09 DC 12/12/17 13:39


500 MG


 


 Amoxicillin


  (Amoxil Cap)  500 mg  NOW  ONCE


 PO  12/12/17 15:15


 12/12/17 15:16 DC 12/12/17 15:52


500 MG











ED Course


ED COURSE: 


Vital signs were reviewed and showed normal vitals


The patients medical record was reviewed


The above diagnostic studies were performed and reviewed.


ED treatments and interventions as stated above. 





1300: The patient was evaluated in room B10. A complete history and physical 

examination was performed.





1307: Tylenol 500mg PO





1459: Upon reevaluation, the patient is doing well. She is additionally 

complaining of some ear pain, and she notes that she has had three ear 

infections since August.I discussed my findings with the patient and she 

understands and agrees with the treatment plan.   


Based on the patients age, coexisting illnesses, exam and lab findings the 

decision to treat as an outpatient was made.


The patient remained stable while under my care.


The patient appeared well at the time of discharge.





1515: Amoxicillin 500mg PO





Medical Decision


Differential Diagnosis includes but is not limited to headache, tension headache

, cluster headache, migraine, subarachnoid hemorrhage, meningitis, mass, 

central venous thrombus, concussion, trauma and epidural/subdural hemorrhage.





Patient is a 27-year-old female who presents to ER for constant head pressure.  

This has been intermittently coming going for the past 6 months but has now 

been more constant.  Patient is completely neurologically intact.  CT head was 

negative.  Labs show a mild leukocytosis which is consistent with previous 

white count.  BMP was unremarkable.  Beta hCG 45,000.  On reevaluation patient 

noted that she also has left ear pain.  On exam.  She has an otitis externa to 

have difficulty visualizing the TM.  She was given ofloxacin drops in the ER 

and discharged with them along with amoxicillin as I could not visualize the 

TM.  Patient was given a referral to ENT as she has been treated for this a 

total of 3 times over the past 6 months.  Do not believe this is consistent 

with infection as there is no signs meningitis or encephalitis.  History not 

consistent with trauma or a subarachnoid bleed.  Do not believe symptoms are 

consistent with central venous thrombus either.  Discussed with Pt concerning 

signs and symptoms to watch out for. Pt was instructed to follow up with their 

PCP and discussed with the patient their option to return to the ED at anytime 

for persistent or worsening symptoms. The appropriate anticipatory guidance and 

out-patient management, including indications for return to the emergency 

department, were explained at length to the patient and understood.





Medication Reconcilliation


Current Medication List:  was personally reviewed by me





Blood Pressure Screening


Patient's blood pressure:  Normal blood pressure





Impression





 Primary Impression:  


 Cephalalgia





Scribe Attestation


The scribe's documentation has been prepared under my direction and personally 

reviewed by me in its entirety. I confirm that the note above accurately 

reflects all work, treatment, procedures, and medical decision making performed 

by me.





Departure Information


Dispostion


Home / Self-Care





Prescriptions





Amoxicillin (AMOXIL) 500 Mg Cap


500 MG PO TID, #30 CAP


   Prov: Sandeep Angel, DO         12/12/17





Referrals


No Doctor, Assigned (PCP)





Forms


HOME CARE DOCUMENTATION FORM,                                                 

               IMPORTANT VISIT INFORMATION





Patient Instructions


ED Otitis Externa, Headache Pain, My [facility]





Additional Instructions





Please follow up with your primary care doctor with in the next 24 hours.  Any 

worsening of your symptoms, please return to the ED immediately. This includes 

any fevers greater than 100.4, worsening pain, chest pain, weakness or numbness 

in arms or legs, shortness breath, persistent nausea, vomiting, unable to eat 

or drink, or any other concerning signs or symptoms from your standpoint.








Please take ofloxacin drops 2 times a day the next 7 days.





Please follow up with Ears nose and throat.





Problem Qualifiers








 Primary Impression:  


 Cephalalgia


 Headache type:  unspecified  Headache chronicity pattern:  unspecified pattern

  Intractability:  not intractable  Qualified Codes:  R51 - Headache

## 2018-03-22 ENCOUNTER — HOSPITAL ENCOUNTER (EMERGENCY)
Dept: HOSPITAL 45 - C.EDB | Age: 28
Discharge: HOME | End: 2018-03-22
Payer: COMMERCIAL

## 2018-03-22 VITALS — DIASTOLIC BLOOD PRESSURE: 70 MMHG | SYSTOLIC BLOOD PRESSURE: 106 MMHG | OXYGEN SATURATION: 99 % | HEART RATE: 79 BPM

## 2018-03-22 VITALS
WEIGHT: 174.17 LBS | WEIGHT: 174.17 LBS | HEIGHT: 62.01 IN | BODY MASS INDEX: 31.65 KG/M2 | BODY MASS INDEX: 31.65 KG/M2 | HEIGHT: 62.01 IN

## 2018-03-22 VITALS — TEMPERATURE: 98.6 F

## 2018-03-22 DIAGNOSIS — F17.200: ICD-10-CM

## 2018-03-22 DIAGNOSIS — R51: ICD-10-CM

## 2018-03-22 DIAGNOSIS — R20.0: ICD-10-CM

## 2018-03-22 DIAGNOSIS — R11.0: ICD-10-CM

## 2018-03-22 DIAGNOSIS — Z88.6: ICD-10-CM

## 2018-03-22 DIAGNOSIS — O99.89: Primary | ICD-10-CM

## 2018-03-22 DIAGNOSIS — Z82.49: ICD-10-CM

## 2018-03-22 DIAGNOSIS — Z3A.00: ICD-10-CM

## 2018-03-22 DIAGNOSIS — D72.829: ICD-10-CM

## 2018-03-22 DIAGNOSIS — Z83.79: ICD-10-CM

## 2018-03-22 DIAGNOSIS — Z83.3: ICD-10-CM

## 2018-03-22 DIAGNOSIS — R42: ICD-10-CM

## 2018-03-22 DIAGNOSIS — I10: ICD-10-CM

## 2018-03-22 LAB
ALBUMIN SERPL-MCNC: 2.8 GM/DL (ref 3.4–5)
ALP SERPL-CCNC: 105 U/L (ref 45–117)
ALT SERPL-CCNC: 18 U/L (ref 12–78)
AST SERPL-CCNC: 13 U/L (ref 15–37)
BASOPHILS # BLD: 0.04 K/UL (ref 0–0.2)
BASOPHILS NFR BLD: 0.2 %
BUN SERPL-MCNC: 5 MG/DL (ref 7–18)
CALCIUM SERPL-MCNC: 8.8 MG/DL (ref 8.5–10.1)
CK MB SERPL-MCNC: 0.6 NG/ML (ref 0.5–3.6)
CO2 SERPL-SCNC: 24 MMOL/L (ref 21–32)
CREAT SERPL-MCNC: 0.44 MG/DL (ref 0.6–1.2)
EOS ABS #: 0.12 K/UL (ref 0–0.5)
EOSINOPHIL NFR BLD AUTO: 284 K/UL (ref 130–400)
GLUCOSE SERPL-MCNC: 79 MG/DL (ref 70–99)
HCT VFR BLD CALC: 33.9 % (ref 37–47)
HGB BLD-MCNC: 11.7 G/DL (ref 12–16)
IG#: 0.18 K/UL (ref 0–0.02)
IMM GRANULOCYTES NFR BLD AUTO: 18.3 %
LYMPHOCYTES # BLD: 3.38 K/UL (ref 1.2–3.4)
MCH RBC QN AUTO: 31.5 PG (ref 25–34)
MCHC RBC AUTO-ENTMCNC: 34.5 G/DL (ref 32–36)
MCV RBC AUTO: 91.1 FL (ref 80–100)
MONO ABS #: 1.29 K/UL (ref 0.11–0.59)
MONOCYTES NFR BLD: 7 %
NEUT ABS #: 13.42 K/UL (ref 1.4–6.5)
NEUTROPHILS # BLD AUTO: 0.7 %
NEUTROPHILS NFR BLD AUTO: 72.8 %
PMV BLD AUTO: 10 FL (ref 7.4–10.4)
POTASSIUM SERPL-SCNC: 3.3 MMOL/L (ref 3.5–5.1)
PROT SERPL-MCNC: 6.8 GM/DL (ref 6.4–8.2)
RED CELL DISTRIBUTION WIDTH CV: 13.7 % (ref 11.5–14.5)
RED CELL DISTRIBUTION WIDTH SD: 45.5 FL (ref 36.4–46.3)
SODIUM SERPL-SCNC: 137 MMOL/L (ref 136–145)
WBC # BLD AUTO: 18.43 K/UL (ref 4.8–10.8)

## 2018-03-22 NOTE — EMERGENCY ROOM VISIT NOTE
History


Report prepared by Roxanne:  Светлана Swartz


Under the Supervision of:  Dr. Shaheen Roberson D.O.


First contact with patient:  20:51


Chief Complaint:  DIZZY


Stated Complaint:  6 MONTHS PREG, DIZZY


Nursing Triage Summary:  


C/o dizziness since this morning. Dizzy while sitting and moving. Reports cold 


sweats and numbness in hands and feet.





History of Present Illness


The patient is a 27 year old female who presents to the Emergency Room with 

complaints of dizziness beginning this morning. The patient also complains of a 

headache, nausea, and numbness in her arms and legs, but denies having urinary 

symptoms and vaginal discharge. She states that she has a history of migraines 

but states that this pain is different and it feels more like she is going to 

pass out. She states that she did not take medications for her symptoms.  The 

patient reports that she is 6 months pregnant.





   Source of History:  patient


   Onset:  this morning


   Position:  other (global)


   Quality:  other (dizziness)


   Associated Symptoms:  + headache, + nausea, + numbness (in arms and legs), 

No urinary symptoms


Note:


also denies: vaginal discharge





Review of Systems


See HPI for pertinent positives & negatives. A total of 10 systems reviewed and 

were otherwise negative.





Past Medical & Surgical


Medical Problems:


(1) Anxiety


(2) HTN (hypertension)


(3) Hypokalemia


(4) left foot surgery


(5) Tibial plateau fracture, left


(6) Vitamin D deficiency








Family History





Diabetes mellitus


Gallbladder disease


Hypertension





Social History


Smoking Status:  Current Every Day Smoker


Alcohol Use:  occasionally


Drug Use:  none


Marital Status:  single


Housing Status:  other


Occupation Status:  employed





Current/Historical Medications


Scheduled


Atenolol (Atenolol), 25 MG PO QAM


Calcium Carbonate (Tums), 500 MG PO PRN


Multivit/Min/Iron/Fol Ac/Pren (Prenatal Vitamin), 1 TAB PO DAILY





Allergies


Coded Allergies:  


     Tramadol (Verified  Adverse Reaction, Intermediate, N/V, 12/12/17)





Physical Exam


Vital Signs











  Date Time  Temp Pulse Resp B/P (MAP) Pulse Ox O2 Delivery O2 Flow Rate FiO2


 


3/22/18 21:55  72 16 123/76 99 Room Air  


 


3/22/18 19:33 37.0 99 20 122/82 95 Room Air  











Physical Exam


CONSTITUTIONAL/VITAL SIGNS: Reviewed / noted above.


GENERAL: Non-toxic in appearance. 


INTEGUMENTARY: Warm, dry, and Pink.


HEAD: Normocephalic.


EYES: without scleral icterus or trauma.


ENT/OROPHARYNX: clear and moist.


LYMPHADENOPATHY/NECK: Is supple without lymphadenopathy or meningismus.


RESPIRATORY: Lungs clear and equal.


CARDIOVASCULAR: Regular rate and rhythm.


GI/ABDOMEN: Soft and nontender. No organomegaly or pulsatile mass. No rebound 

or guarding. Normal bowel sounds.


EXTREMITIES: Warm and well perfused.


BACK: No CVA tenderness.


NEUROLOGICAL: Intact without focal deficits. 


PSYCHIATRIC: normal affect.


MUSCULOSKELETAL: Normally developed with good muscle tone.





Medical Decision & Procedures


Laboratory Results


3/22/18 20:30








Red Blood Count 3.72, Mean Corpuscular Volume 91.1, Mean Corpuscular Hemoglobin 

31.5, Mean Corpuscular Hemoglobin Concent 34.5, Mean Platelet Volume 10.0, 

Neutrophils (%) (Auto) 72.8, Lymphocytes (%) (Auto) 18.3, Monocytes (%) (Auto) 

7.0, Eosinophils (%) (Auto) 0.7, Basophils (%) (Auto) 0.2, Neutrophils # (Auto) 

13.42, Lymphocytes # (Auto) 3.38, Monocytes # (Auto) 1.29, Eosinophils # (Auto) 

0.12, Basophils # (Auto) 0.04





3/22/18 20:30

















Test


  3/22/18


20:25 3/22/18


20:30


 


Urine Color YELLOW  


 


Urine Appearance CLEAR (CLEAR)  


 


Urine pH 7.0 (4.5-7.5)  


 


Urine Specific Gravity


  1.006


(1.000-1.030) 


 


 


Urine Protein NEG (NEG)  


 


Urine Glucose (UA) NEG (NEG)  


 


Urine Ketones NEG (NEG)  


 


Urine Occult Blood TRACE (NEG)  


 


Urine Nitrite NEG (NEG)  


 


Urine Bilirubin NEG (NEG)  


 


Urine Urobilinogen NEG (NEG)  


 


Urine Leukocyte Esterase NEG (NEG)  


 


Urine WBC (Auto) 1-5 /hpf (0-5)  


 


Urine RBC (Auto) 0-4 /hpf (0-4)  


 


Urine Hyaline Casts (Auto) 1-5 /lpf (0-5)  


 


Urine Epithelial Cells (Auto) >30 /lpf (0-5)  


 


Urine Bacteria (Auto) 1+ (NEG)  


 


White Blood Count


  


  18.43 K/uL


(4.8-10.8)


 


Red Blood Count


  


  3.72 M/uL


(4.2-5.4)


 


Hemoglobin


  


  11.7 g/dL


(12.0-16.0)


 


Hematocrit  33.9 % (37-47) 


 


Mean Corpuscular Volume


  


  91.1 fL


()


 


Mean Corpuscular Hemoglobin


  


  31.5 pg


(25-34)


 


Mean Corpuscular Hemoglobin


Concent 


  34.5 g/dl


(32-36)


 


Platelet Count


  


  284 K/uL


(130-400)


 


Mean Platelet Volume


  


  10.0 fL


(7.4-10.4)


 


Neutrophils (%) (Auto)  72.8 % 


 


Lymphocytes (%) (Auto)  18.3 % 


 


Monocytes (%) (Auto)  7.0 % 


 


Eosinophils (%) (Auto)  0.7 % 


 


Basophils (%) (Auto)  0.2 % 


 


Neutrophils # (Auto)


  


  13.42 K/uL


(1.4-6.5)


 


Lymphocytes # (Auto)


  


  3.38 K/uL


(1.2-3.4)


 


Monocytes # (Auto)


  


  1.29 K/uL


(0.11-0.59)


 


Eosinophils # (Auto)


  


  0.12 K/uL


(0-0.5)


 


Basophils # (Auto)


  


  0.04 K/uL


(0-0.2)


 


RDW Standard Deviation


  


  45.5 fL


(36.4-46.3)


 


RDW Coefficient of Variation


  


  13.7 %


(11.5-14.5)


 


Immature Granulocyte % (Auto)  1.0 % 


 


Immature Granulocyte # (Auto)


  


  0.18 K/uL


(0.00-0.02)


 


Platelet Estimate  NORMAL 


 


Anion Gap


  


  8.0 mmol/L


(3-11)


 


Est Creatinine Clear Calc


Drug Dose 


  187.0 ml/min 


 


 


Estimated GFR (


American) 


  > 150.0 


 


 


Estimated GFR (Non-


American 


  138.3 


 


 


BUN/Creatinine Ratio  10.8 (10-20) 


 


Calcium Level


  


  8.8 mg/dl


(8.5-10.1)


 


Magnesium Level


  


  2.0 mg/dl


(1.8-2.4)


 


Total Bilirubin


  


  0.2 mg/dl


(0.2-1)


 


Direct Bilirubin


  


  < 0.1 mg/dl


(0-0.2)


 


Aspartate Amino Transf


(AST/SGOT) 


  13 U/L (15-37) 


 


 


Alanine Aminotransferase


(ALT/SGPT) 


  18 U/L (12-78) 


 


 


Alkaline Phosphatase


  


  105 U/L


()


 


Total Creatine Kinase


  


  55 U/L


()


 


Creatine Kinase MB


  


  0.6 ng/ml


(0.5-3.6)


 


Creatine Kinase MB Ratio  1.1 (0-3.0) 


 


Total Protein


  


  6.8 gm/dl


(6.4-8.2)


 


Albumin


  


  2.8 gm/dl


(3.4-5.0)


 


Thyroid Stimulating Hormone


(TSH) 


  2.380 uIu/ml


(0.300-4.500)





Laboratory results as stated above per my review.





Medications Administered











 Medications


  (Trade)  Dose


 Ordered  Sig/Alex


 Route  Start Time


 Stop Time Status Last Admin


Dose Admin


 


 Sodium Chloride  1,000 ml @ 


 999 mls/hr  Q1H1M STAT


 IV  3/22/18 20:55


 3/22/18 21:55 DC 3/22/18 21:03


999 MLS/HR











ED Course


2053: Previous medical records were reviewed. The patient was evaluated in room 

C11B. A complete history and physical examination was performed.





2055: Ordered Sodium Chloride 1,000 ml @ 999 mls/hr IV. 





2330: On reevaluation, the patient is feeling better. I discussed the results 

and findings with the patient. She verbalized agreement of the treatment plan. 

She was discharged home.





Medical Decision


Differential includes acute coronary syndrome, myocardial infarction, CVA, TIA, 

anemia, infection, pneumonia, UTI, pyelonephritis, poor nutrition, dehydration, 

electrolyte disturbance,hypoglycemia.





This is a 27-year-old female who presents to the ED with a chief complaint of 

some lightheadedness.  She also developed a left-sided migraine, some mild 

blurring of her vision and some twitching in her left eye this evening.  The 

patient feels that she might be dehydrated.  She is 6 months pregnant.  Her 

vital signs are normal.  Her physical exam was unremarkable.  The patient's 

abdomen is gravid.  CBC reveals a white count of 18,000.  This is likely 

related to pregnancy.  Complete metabolic panel was unremarkable and a urine 

did not show obvious infection.  The patient was hydrated with a liter of 

normal saline IV.  She is felt to be stable for discharge.  She was feeling 

better.





Medication Reconcilliation


Current Medication List:  was personally reviewed by me





Blood Pressure Screening


Patient's blood pressure:  Normal blood pressure





Impression





 Primary Impression:  


 Dizziness





Scribe Attestation


The scribe's documentation has been prepared under my direction and personally 

reviewed by me in its entirety. I confirm that the note above accurately 

reflects all work, treatment, procedures, and medical decision making performed 

by me.





Departure Information


Dispostion


Home / Self-Care





Referrals


No Doctor, Assigned (PCP)





Forms


HOME CARE DOCUMENTATION FORM,                                                 

               IMPORTANT VISIT INFORMATION





Patient Instructions


My [facility]





Additional Instructions





Follow-up with your doctor for further care and evaluation in 1-2 days.  Return 

to the emergency department for worsening or new symptoms or any concerns.


You have been examined and treated today on an emergency basis only. This is 

not a substitute for, or an effort to provide, complete comprehensive medical 

care. It is impossible to recognize and treat all injuries or illnesses in a 

single emergency department visit. It is therefore important that you follow up 

closely with your doctor.  Call as soon as possible for an appointment.

## 2018-04-04 ENCOUNTER — HOSPITAL ENCOUNTER (EMERGENCY)
Dept: HOSPITAL 45 - C.EDB | Age: 28
Discharge: HOME | End: 2018-04-04
Payer: COMMERCIAL

## 2018-04-04 VITALS — HEART RATE: 99 BPM | DIASTOLIC BLOOD PRESSURE: 77 MMHG | OXYGEN SATURATION: 97 % | SYSTOLIC BLOOD PRESSURE: 116 MMHG

## 2018-04-04 VITALS
WEIGHT: 174.83 LBS | WEIGHT: 174.83 LBS | HEIGHT: 62.01 IN | HEIGHT: 62.01 IN | BODY MASS INDEX: 31.77 KG/M2 | BODY MASS INDEX: 31.77 KG/M2

## 2018-04-04 VITALS — TEMPERATURE: 99.14 F

## 2018-04-04 DIAGNOSIS — Z83.3: ICD-10-CM

## 2018-04-04 DIAGNOSIS — O21.9: ICD-10-CM

## 2018-04-04 DIAGNOSIS — Z88.6: ICD-10-CM

## 2018-04-04 DIAGNOSIS — R51: ICD-10-CM

## 2018-04-04 DIAGNOSIS — Z71.6: ICD-10-CM

## 2018-04-04 DIAGNOSIS — M54.9: ICD-10-CM

## 2018-04-04 DIAGNOSIS — O10.013: ICD-10-CM

## 2018-04-04 DIAGNOSIS — Z98.890: ICD-10-CM

## 2018-04-04 DIAGNOSIS — O99.332: ICD-10-CM

## 2018-04-04 DIAGNOSIS — Z3A.27: ICD-10-CM

## 2018-04-04 DIAGNOSIS — Z79.899: ICD-10-CM

## 2018-04-04 DIAGNOSIS — O26.892: Primary | ICD-10-CM

## 2018-04-04 DIAGNOSIS — F17.210: ICD-10-CM

## 2018-04-04 DIAGNOSIS — Z82.49: ICD-10-CM

## 2018-04-04 DIAGNOSIS — H72.92: ICD-10-CM

## 2018-04-04 DIAGNOSIS — O99.89: ICD-10-CM

## 2018-04-04 DIAGNOSIS — H66.3X2: ICD-10-CM

## 2018-04-04 LAB — INFLUENZA B ANTIGEN: (no result)

## 2018-04-04 NOTE — EMERGENCY ROOM VISIT NOTE
History


Report prepared by Roxanne:  Reinaldo Huizar


Under the Supervision of:  Dr. Spike Torres M.D.


First contact with patient:  17:12


Chief Complaint:  FLU LIKE SX


Stated Complaint:  BODY ACHES, ABDOMINAL PAIN, 27 WKS PREG





History of Present Illness


The patient is a 27 year old  female with a past medical history of 

ear infections, tachycardia, and palpitations who is currently pregnant and 

presents to the ED with a cc of constant flu-like symptoms beginning two days 

ago. Positive for chest pain, back pain, headache, ear pain, nausea, and 

vomiting. Negative for vaginal bleeding/discharge, urinary symptoms, and 

changes to her bowel movements. The patient states that she has had flu like 

symptoms for the last two days and that her head hurts the most. She notes that 

she is currently 27 weeks pregnant and that this is her first pregnancy. She 

reports that she takes atenolol for her heart rate and palpitations. The 

patient states that she smokes cigarettes.





   Source of History:  patient


   Onset:  two days ago


   Position:  head


   Timing:  constant


   Associated Symptoms:  + headache, + chest pain, + nausea, + vomiting, + back 

pain, No urinary symptoms


Note:


The patient also complains of ear pain. She denies any vaginal bleeding/

discharge and changes to her bowel movements.





Review of Systems


See HPI for pertinent positives and negatives.  A total of ten systems were 

reviewed and were otherwise negative.





Past Medical & Surgical


Medical Problems:


(1) Anxiety


(2) Ear infection


(3) HTN (hypertension)


(4) Hypokalemia


(5) left foot surgery


(6) Pregnancy


(7) Tibial plateau fracture, left


(8) Vitamin D deficiency


Surgical Problems:


(1) H/O foot surgery


(2) H/O knee surgery








Family History





Diabetes mellitus


Gallbladder disease


Hypertension





Social History


Smoking Status:  Current Every Day Smoker


Alcohol Use:  occasionally


Drug Use:  none


Marital Status:  single


Occupation Status:  unemployed





Current/Historical Medications


Scheduled


Amoxicillin & Pot Clavulanate (Augmentin 875-125 mg), 875 MG PO BID


Atenolol (Atenolol), 25 MG PO QAM


Calcium Carbonate (Tums), 500 MG PO PRN


Multivit/Min/Iron/Fol Ac/Pren (Prenatal Vitamin), 1 TAB PO DAILY


Ofloxacin (Otic) (Floxin Otic), 10 DROPS OT BID


Oseltamivir (Tamiflu), 75 MG PO BID





Scheduled PRN


Ondansetron Hcl (Zofran), 4 MG PO UD PRN for Nausea





Allergies


Coded Allergies:  


     Tramadol (Verified  Adverse Reaction, Intermediate, N/V, 18)





Physical Exam


Vital Signs











  Date Time  Temp Pulse Resp B/P (MAP) Pulse Ox O2 Delivery O2 Flow Rate FiO2


 


18 19:28  101 20 103/78 96 Room Air  


 


18 17:11 37.3 118 17 118/75 98 Room Air  











Physical Exam


GENERAL: Awake, alert, mildly ill appearing, NAD, wearing glasses.


HENT: Normocephalic, atraumatic, perforated left TM with a purulent effusion, 

no signs of otitis externa. 


EYES: Normal conjunctiva. Sclera non-icteric.


NECK: Supple. No nuchal rigidity. FROM.


RESPIRATORY: CTAB, no rhonchi, wheezing, crackles


CARDIAC: RRR, no MRG


ABDOMEN: Soft, NTND, BS+, fundal height 5-8cm above umbilicus.


MSK: No chest wall TTP, no LE edema


NEURO: GCS 15, CN 2-12 intact, moves all 4s on command


SKIN: No rash or jaundice noted.





Medical Decision & Procedures


Laboratory Results











Test


  18


17:45 18


18:00


 


Urine Color YELLOW  


 


Urine Appearance CLEAR (CLEAR)  


 


Urine pH 6.5 (4.5-7.5)  


 


Urine Specific Gravity


  1.011


(1.000-1.030) 


 


 


Urine Protein NEG (NEG)  


 


Urine Glucose (UA) NEG (NEG)  


 


Urine Ketones 2+ (NEG)  


 


Urine Occult Blood 1+ (NEG)  


 


Urine Nitrite NEG (NEG)  


 


Urine Bilirubin NEG (NEG)  


 


Urine Urobilinogen NEG (NEG)  


 


Urine Leukocyte Esterase NEG (NEG)  


 


Urine WBC (Auto) 1-5 /hpf (0-5)  


 


Urine RBC (Auto) 0-4 /hpf (0-4)  


 


Urine Hyaline Casts (Auto) 1-5 /lpf (0-5)  


 


Urine Epithelial Cells (Auto) >30 /lpf (0-5)  


 


Urine Bacteria (Auto) NEG (NEG)  


 


Influenza Type A Antigen


  


  Neg for Influ


A (NEG)


 


Influenza Type B Antigen


  


  Neg for Influ


B (NEG)





Laboratory results reviewed by me





Medications Administered











 Medications


  (Trade)  Dose


 Ordered  Sig/Alex


 Route  Start Time


 Stop Time Status Last Admin


Dose Admin


 


 Amoxicillin/


 Clavulanate


 Potassium


  (Augmentin Tab)  875 mg  NOW  ONCE


 PO  18 17:30


 18 17:31 DC 18 17:56


875 MG


 


 Acetaminophen


  (Tylenol Tab)  650 mg  NOW  STAT


 PO  18 17:20


 18 17:28 DC 18 17:56


650 MG


 


 Diphenhydramine


 HCl


  (Benadryl Inj)  50 mg  NOW  STAT


 IV  18 17:20


 18 17:28 DC 18 17:57


50 MG


 


 Sodium Chloride  1,000 ml @ 


 999 mls/hr  Q1H1M STAT


 IV  18 17:20


 18 18:20 DC 18 17:57


999 MLS/HR


 


 Metoclopramide HCl


  (Reglan Inj)  10 mg  NOW  STAT


 IV.  18 17:20


 18 17:28 DC 18 17:57


10 MG


 


 Oseltamivir


 Phosphate


  (Tamiflu Cap)  75 mg  NOW  STAT


 PO  18 17:20


 18 17:28 DC 18 17:57


75 MG


 


 Ondansetron HCl


  (Zofran Inj)  4 mg  STK-MED ONCE


 .ROUTE  18 19:23


 18 19:24 DC 18 19:25


4 MG











ED Course


1713: The patient was evaluated in room C2. A complete history and physical 

exam was performed.





: I reevaluated and updated the patient. 





: I reevaluated the patient. Discussed results and discharge instructions: 

She verbalized understanding and agreement. The patient is ready for discharge.





Medical Decision


Nursing notes reviewed. Ancillary studies and prior records reviewed. 





The patient is a 27 year old  female with a past medical history of 

ear infections, tachycardia, and palpitations who is currently pregnant and 

presents to the ED with a cc of constant flu-like symptoms beginning two days 

ago. Positive for chest pain, back pain, headache, ear pain, nausea, and 

vomiting. Negative for vaginal bleeding/discharge, urinary symptoms, and 

changes to her bowel movements. 





Differential diagnosis:


Etiologies such as viral syndrome, otitis, pharyngitis, pneumonia, influenza, 

meningitis, urinary tract infection, sepsis, bacteremia, as well as others were 

entertained.





Patient was seen and evaluated the bedside.  Patient is a  

approximately 27 weeks who presents with chief complaint of some headache and 

flulike symptoms.  Patient has been taking Tylenol but without much effect.  

Patient has complained of nausea and dry heaving but without vomiting.  Patient 

denies any antibiotic use, vaginal bleeding, vaginal discharge, or abdominal 

pain.  Patient has a fairly benign exam with the exception of a purulent 

effusion of the left TM with perforation.





Patient did have a urinalysis completed and a flu swab tested.  Patient was 

given Tamiflu empirically given her pregnancy status high risk nature of fluid 

pregnancy.  Patient was also given Augmentin.  Patient also received 

medications and fluids for her headache.  





Upon reassessment the patient was sleeping comfortably.  Patient was feeling 

improved.  Patient's urinalysis was negative.  Patient had a negative flu swab.

  Given the patient's a dramatic improvement patient was deemed suitable for 

outpatient follow-up treatment at this time.  Patient did have a first dose of 

her antibiotic.  Patient was given prescriptions for her antibiotic, Tamiflu, 

and ofloxacin drops.  Patient did have an episode of nausea with vomiting prior 

to discharge.  Patient was given an additional 4 mg IV Zofran and the patient 

was able to tolerate p.o.  Patient was given strict follow-up, discharge, and 

return precautions.  All questions were answered.  Patient was deemed suitable 

for outpatient follow-up at this time.  Patient agreed with the plan of care 

and was safely discharged home.





Medication Reconcilliation


Current Medication List:  was personally reviewed by me





Blood Pressure Screening


Patient's blood pressure:  Normal blood pressure


Blood pressure disposition:  Did not require urgent referral





Impression





 Primary Impression:  


 Influenza-like symptoms


 Additional Impressions:  


 Headache


 Otitis media


 Perforated tympanic membrane


 Encounter for smoking cessation counseling





Scribe Attestation


The scribe's documentation has been prepared under my direction and personally 

reviewed by me in its entirety. I confirm that the note above accurately 

reflects all work, treatment, procedures, and medical decision making performed 

by me.





Departure Information


Dispostion


Home / Self-Care





Prescriptions





Oseltamivir (Tamiflu) 75 Mg Cap


75 MG PO BID for 5 Days, #9 CAP


   Prov: Spike Torres M.D.         18 


Ofloxacin (Otic) (FLOXIN OTIC) 0.3 % Rajan


10 DROPS OT BID for 14 Days, #1 BTL


   Prov: Spike Torres M.D.         18 


Amoxicillin & Pot Clavulanate (Augmentin 875-125 mg) 1 Tab Tab


875 MG PO BID for 10 Days, #20 TAB


   Prov: Spike Torres M.D.         18





Referrals


No Doctor, Assigned (PCP)





Forms


HOME CARE DOCUMENTATION FORM,                                                 

               IMPORTANT VISIT INFORMATION





Patient Instructions


ED Otitis Media Acute Adult, Headache Pain, My [facility]





Additional Instructions





Please return to the emergency department if you have worsening or recurrent 

symptoms not amenable to at-home treatment.  Please call for a follow-up 

appointment with her primary care physician.  Please take your medications as 

prescribed.  If you have other concerns and/or complaints please feel free to 

also call your primary care physician's office or return the ED for further 

evaluation, management, and treatment.





Please follow-up with your OB/GYN.





You may take tylenol 1000 mg every 6 hours as needed for pain.  You may take 

Benadryl for nausea.  Slowly advance her diet as tolerated.





Take your medications as prescribed. If taking an antibiotic consider taking a 

probiotic and/or eating yogurt, but at the least, please take with food as it 

can cause upset stomach.





If you do have persistent ear problems please follow-up with your PCP and 

discuss the need for a possible ear nose and throat referral.





You have been examined and treated today on an emergency basis only. This is 

not a substitute for, or an effort to provide, complete comprehensive medical 

care. It is impossible to recognize and treat all injuries or illnesses in a 

single emergency department visit. It is therefore important that you follow up 

closely with [facility], your PCP, and/or your specialist(s). 

Call as soon as possible for an appointment.





Thank you for your time and consideration. I look forward to speaking with you 

again soon. Please don't hesitate to call us if you have any questions.





Problem Qualifiers








 Additional Impressions:  


 Headache


 Headache type:  unspecified  Headache chronicity pattern:  acute headache  

Intractability:  not intractable  Qualified Codes:  R51 - Headache


 Otitis media


 Otitis media type:  suppurative  Chronicity:  chronic  Laterality:  left  

Suppurative otitis media location:  unspecified location  Qualified Codes:  

H66.3X2 - Other chronic suppurative otitis media, left ear


 Perforated tympanic membrane


 Laterality:  left  Qualified Codes:  H72.92 - Unspecified perforation of 

tympanic membrane, left ear

## 2023-06-07 NOTE — EMERGENCY ROOM VISIT NOTE
History


Report prepared by Roxanne:  Asha Carrillo


Under the Supervision of:  Dr. Sandeep Angel D.O.


First contact with patient:  18:16


Chief Complaint:  CARDIAC ASSESSMENT


Stated Complaint:  CHEST PAIN, DIZZINESS


Nursing Triage Summary:  


pt c/o chest pain started to worsen 2 weeks ago pt reports having chronic 


palpatations. has head pressure when standing, described as sharp and achey





History of Present Illness


The patient is a 26 year old female who presents to the Emergency Room with 

complaints of intermittent heart palpitations that began one month ago.  She 

currently rates her discomfort as a 10/10 in severity.  The patient states that 

she has been experiencing palpations intermittently for a month but states that 

they worsened over the last week.  She states that they started this morning 

when she woke up and last approximately three minutes.  The patient 

additionally notes chest pain for the last two weeks.  She denies any modifying 

factors.  The patient states that she has been experiencing head pressure and a 

headache when standing.  Headaches come on gradually and gradually worsened.  

The patient states that she follows with cardiology.  She reports a history of 

meningitis.  The patient denies any history of blood clots or cancer.  She 

additionally notes lightheadedness and dizziness. The patient reports a history 

of anxiety and notes that she is prescribed Ativan, but denies using it.  Pt 

denies change in vision, fevers, shortness of breath, nausea, hematemesis, 

vomiting, diarrhea, swelling to her lower extremities, pain with urination, and 

melena.





   Source of History:  patient


   Onset:  one month ago


   Position:  other (heart)


   Symptom Intensity:  10/10


   Quality:  other (palpitations)


   Timing:  intermittent


   Associated Symptoms:  + headache, + chest pain


Note:


Associated Symptoms: dizziness and lightheadedness





Review of Systems


See HPI for pertinent positives & negatives. A total of 10 systems reviewed and 

were otherwise negative.





Past Medical & Surgical


Medical Problems:


(1) Anxiety


(2) HTN (hypertension)


(3) Hypokalemia


(4) left foot surgery


(5) Tibial plateau fracture, left


(6) Vitamin D deficiency








Family History





Diabetes mellitus


Gallbladder disease


Hypertension





Social History


Smoking Status:  Current Every Day Smoker


Alcohol Use:  occasionally


Drug Use:  none


Marital Status:  single


Housing Status:  other


Occupation Status:  employed





Current/Historical Medications


Scheduled


Atenolol (Atenolol), 25 MG PO QAM





Allergies


Coded Allergies:  


     Tramadol (Verified  Adverse Reaction, Intermediate, N/V, 10/16/16)





Physical Exam


Vital Signs











  Date Time  Temp Pulse Resp B/P (MAP) Pulse Ox O2 Delivery O2 Flow Rate FiO2


 


7/12/17 21:44  61 20 127/76 95 Room Air  


 


7/12/17 20:30  72 20 124/87 98 Room Air  


 


7/12/17 19:47  66 20 117/83 99 Room Air  


 


7/12/17 19:07  69      


 


7/12/17 19:03  68 20 134/91 99 Room Air  


 


7/12/17 17:24 36.8 75 18 147/94 100 Room Air  











Physical Exam


GENERAL: Sitting up in bed, alert, well appearing, well nourished, no distress, 

non-toxic 


EYE EXAM: normal conjunctiva, PERRL and EOM's grossly intact


OROPHARYNX: no exudate, no erythema, lips, buccal mucosa, and tongue normal and 

mucous membranes are moist


NECK: supple, no nuchal rigidity, no adenopathy, non-tender


LUNGS: Clear to auscultation. Normal chest wall mechanics


HEART: no murmurs, S1 normal and S2 normal 


ABDOMEN: abdomen soft, non-tender, normo-active bowel sounds, no masses, no 

rebound or guarding. 


BACK: Back is symmetrical on inspection and there is no deformity, no midline 

tenderness, no CVA tenderness. 


SKIN: no rashes and no bruising 


UPPER EXTREMITIES: upper extremities are grossly normal. 


LOWER EXTREMITIES: No pitting edema.


NEURO EXAM: Normal sensorium, cranial nerves II-XII intact, normal speech,  no 

weakness of arms, no weakness of legs. No drift. Finger to nose intact. Gross 

sensation intact.





Medical Decision & Procedures


ER Provider


Diagnostic Interpretation:


Radiology results as stated below per my review and the radiologist's 

interpretation: 





CHEST ONE VIEW PORTABLE





CLINICAL HISTORY: Palpitations, Chest Pain    





COMPARISON STUDY:  Chest radiograph August 7, 2014.





FINDINGS: Lung volumes are normal. There is no pneumothorax or pleural effusion.


There is no consolidation to suggest pneumonia. Pulmonary vascularity is normal.


Cardiomediastinal silhouette is normal. 





IMPRESSION:  No acute cardiopulmonary findings. 





Electronically signed by:  Wisam Shankar M.D.


7/12/2017 6:40 PM





Dictated Date/Time:  7/12/2017 6:40 PM





Laboratory Results


7/12/17 18:55








Red Blood Count 4.46, Mean Corpuscular Volume 88.1, Mean Corpuscular Hemoglobin 

29.4, Mean Corpuscular Hemoglobin Concent 33.3, Mean Platelet Volume 10.6, 

Neutrophils (%) (Auto) 64.4, Lymphocytes (%) (Auto) 27.3, Monocytes (%) (Auto) 

7.2, Eosinophils (%) (Auto) 0.6, Basophils (%) (Auto) 0.3, Neutrophils # (Auto) 

9.00, Lymphocytes # (Auto) 3.82, Monocytes # (Auto) 1.01, Eosinophils # (Auto) 

0.09, Basophils # (Auto) 0.04





7/12/17 18:55

















Test


  7/12/17


18:40 7/12/17


18:55 7/12/17


19:01 7/12/17


21:15


 


Urine Pregnancy Test NEG (NEG)    


 


White Blood Count


  


  13.99 K/uL


(4.8-10.8) 


  


 


 


Red Blood Count


  


  4.46 M/uL


(4.2-5.4) 


  


 


 


Hemoglobin


  


  13.1 g/dL


(12.0-16.0) 


  


 


 


Hematocrit  39.3 % (37-47)   


 


Mean Corpuscular Volume


  


  88.1 fL


() 


  


 


 


Mean Corpuscular Hemoglobin


  


  29.4 pg


(25-34) 


  


 


 


Mean Corpuscular Hemoglobin


Concent 


  33.3 g/dl


(32-36) 


  


 


 


Platelet Count


  


  278 K/uL


(130-400) 


  


 


 


Mean Platelet Volume


  


  10.6 fL


(7.4-10.4) 


  


 


 


Neutrophils (%) (Auto)  64.4 %   


 


Lymphocytes (%) (Auto)  27.3 %   


 


Monocytes (%) (Auto)  7.2 %   


 


Eosinophils (%) (Auto)  0.6 %   


 


Basophils (%) (Auto)  0.3 %   


 


Neutrophils # (Auto)


  


  9.00 K/uL


(1.4-6.5) 


  


 


 


Lymphocytes # (Auto)


  


  3.82 K/uL


(1.2-3.4) 


  


 


 


Monocytes # (Auto)


  


  1.01 K/uL


(0.11-0.59) 


  


 


 


Eosinophils # (Auto)


  


  0.09 K/uL


(0-0.5) 


  


 


 


Basophils # (Auto)


  


  0.04 K/uL


(0-0.2) 


  


 


 


RDW Standard Deviation


  


  44.1 fL


(36.4-46.3) 


  


 


 


RDW Coefficient of Variation


  


  13.6 %


(11.5-14.5) 


  


 


 


Immature Granulocyte % (Auto)  0.2 %   


 


Immature Granulocyte # (Auto)


  


  0.03 K/uL


(0.00-0.02) 


  


 


 


Anion Gap


  


  8.0 mmol/L


(3-11) 


  


 


 


Est Creatinine Clear Calc


Drug Dose 


  118.3 ml/min 


  


  


 


 


Estimated GFR (


American) 


  138.6 


  


  


 


 


Estimated GFR (Non-


American 


  119.6 


  


  


 


 


BUN/Creatinine Ratio  7.7 (10-20)   


 


Calcium Level


  


  9.2 mg/dl


(8.5-10.1) 


  


 


 


Thyroid Stimulating Hormone


(TSH) 


  0.998 uIu/ml


(0.300-4.500) 


  


 


 


Bedside D-Dimer


  


  


  123 ng/mlFEU


(0-450) 


 


 


Troponin I


  


  


  


  < 0.015 ng/ml


(0-0.045)








Laboratory results per my review.





Medications Administered











 Medications


  (Trade)  Dose


 Ordered  Sig/Alex


 Route  Start Time


 Stop Time Status Last Admin


Dose Admin


 


 Ketorolac


 Tromethamine


  (Toradol Inj)  30 mg  NOW  STAT


 IV  7/12/17 20:00


 7/12/17 20:01 DC 7/12/17 20:33


30 MG











ECG


Indication:  chest pain


Rate (beats per minute):  76


Rhythm:  sinus rhythm


Findings:  no acute ischemic change, no ectopy, other (normal axis)





ED Course


ED COURSE: 


Vital signs were reviewed and showed normal vitals


The patients medical record was reviewed


The above diagnostic studies were performed and reviewed.


ED treatments and interventions as stated above. 





1816: The patient was evaluated in room B3A. A complete history and physical 

examination was performed by Dr. Dumont, Family Medicine Resident.





1930: The patient was evaluated in room B3A.  A complete history and physical 

examination was performed.





2000: Ordered Toradol Inj 30 mg IV.





2210: Upon reevaluation, the patient is she is feeling much better.I discussed 

my findings with the patient and she understands and agrees with the treatment 

plan.   


Based on the patients age, coexisting illnesses, exam and lab findings the 

decision to treat as an outpatient was made.


The patient remained stable while under my care.


The patient appeared well at the time of discharge.





Medical Decision


Differential diagnoses includes but is not limited to acute coronary syndrome, 

myocardial infarction, pericarditis, pulmonary embolus, aortic dissection, 

pneumonia, pneumothorax, musculoskeletal, shingles, esophageal, headache, 

tension headache, cluster headache, migraine, subarachnoid hemorrhage, 

meningitis, mass, central venous thrombus, concussion, trauma and epidural/

subdural hemorrhage.





Medication Reconciliation: I attest that I have personally reviewed the patient'

s current medication list.





Blood pressure screening: Patient was found to have normal blood pressure on 

screening and does not require follow-up. 





Patient is a 26-year-old female who presents the ER for intermittent heart 

palpitations in the past 2-3 weeks.  She's been worked up for cardiology for 

this in the past.  Just complains of a chest pain with this as well.  Symptoms 

have been present all day today.  No weakness or numbness in arms or legs.  

According to the headache which is been coming and going over the past 2 weeks.

  She is completely neurologically intact.  Labs show a leukocytosis of 13.9 

thousand.  BMP shows a slightly low potassium.  Troponins were negative 2.  

TSH is normal.  D-dimer was negative.  Urine pregnancy was negative.  Chest x-

ray and EKG were unremarkable.  Patient was updated in regards to findings, she 

was given a dose of Toradol.  She felt 100% better was discharged follow with 

her PCP. Discussed with Pt concerning signs and symptoms to watch out for. Pt 

was instructed to follow up with their PCP and discussed with the patient their 

option to return to the ED at anytime for persistent or worsening symptoms. The 

appropriate anticipatory guidance and out-patient management, including 

indications for return to the emergency department, were explained at length to 

the patient and understood.





Impression





 Primary Impression:  


 Precordial chest pain


 Additional Impression:  


 Cephalalgia





Scribe Attestation


The scribe's documentation has been prepared under my direction and personally 

reviewed by me in its entirety. I confirm that the note above accurately 

reflects all work, treatment, procedures, and medical decision making performed 

by me.





Departure Information


Dispostion


Home / Self-Care





Referrals


No Doctor, Assigned (PCP)





Forms


IMPORTANT VISIT INFORMATION





Patient Instructions


Chest Pain - [facility], Critical access hospital





Additional Instructions





Follow up with [facility] at the first available appointment for 

follow up of your current condition





Ibuprofen(Motrin, Advil) may be used for fever or pain.  Use 400mg every six 

hours as needed.  Take with food.  Avoid using more than 1600mg in a 24 hour 

period.  Do not use 1600mg per day for more than three consecutive days without 

physician direction.  Prolonged inappropriate use can lead to stomach upset or 

ulcers. 


(AND/OR)


Acetaminophen(Tylenol) may be used for fever or pain.  Use 1000mg every six 

hours as needed.  Avoid using more than 4000mg in a 24 hour period.  





Rest and drink plenty of fluids as tolerated.





Continue current medications.





Avoid strenuous activities and anything that worsens your pain.  Resume normal 

activities once your symptoms resolve.    





Return to the ER immediately for worsening or persistent chest pain, abdominal 

pain, vomiting, fevers, chest pains, difficulty breathing, worsening of your 

condition, or as needed.





Problem Qualifiers








 Additional Impression:  


 Cephalalgia


 Headache type:  unspecified  Headache chronicity pattern:  acute headache  

Intractability:  not intractable  Qualified Codes:  R51 - Headache no